# Patient Record
Sex: MALE | URBAN - METROPOLITAN AREA
[De-identification: names, ages, dates, MRNs, and addresses within clinical notes are randomized per-mention and may not be internally consistent; named-entity substitution may affect disease eponyms.]

---

## 2023-07-09 ENCOUNTER — NURSE TRIAGE (OUTPATIENT)
Dept: ADMINISTRATIVE | Facility: CLINIC | Age: 73
End: 2023-07-09

## 2023-07-09 NOTE — TELEPHONE ENCOUNTER
"Patient c/o redness and a cloudy spot to his right eye. Symptoms started a few days ago. Denies pain or fever. Advised per protocol to be seen within the next 4 hours. Patient plans to go to the nearest  for further evaluation. Advised the patient to call back with any further questions or if symptoms worsen.        Reason for Disposition   Cloudy spot or sore seen on the cornea (clear part of the eye)    Additional Information   Negative: SEVERE eye pain   Negative: [1] Eyelids are very swollen (shut or almost) AND [2] fever   Negative: [1] Eyelid (outer) is very red (or tender to touch) AND [2] fever   Negative: [1] Foreign body sensation ("feels like something is in there") AND [2] irrigation didn't help   Negative: Vomiting   Negative: [1] Recent eye surgery AND [2] increasing eye pain   Negative: Patient sounds very sick or weak to the triager   Negative: Looking at light causes MODERATE to SEVERE eye pain (i.e., photophobia)   Negative: New or worsening blurred vision   Negative: MODERATE eye pain or discomfort (e.g., interferes with normal activities or awakens from sleep; more than mild)    Protocols used: Eye - Red Without Pus-A-AH    "